# Patient Record
Sex: MALE | URBAN - METROPOLITAN AREA
[De-identification: names, ages, dates, MRNs, and addresses within clinical notes are randomized per-mention and may not be internally consistent; named-entity substitution may affect disease eponyms.]

---

## 2024-02-08 ENCOUNTER — APPOINTMENT (OUTPATIENT)
Dept: URBAN - METROPOLITAN AREA CLINIC 306 | Age: 64
Setting detail: DERMATOLOGY
End: 2024-02-08

## 2024-02-08 DIAGNOSIS — Z41.9 ENCOUNTER FOR PROCEDURE FOR PURPOSES OTHER THAN REMEDYING HEALTH STATE, UNSPECIFIED: ICD-10-CM

## 2024-02-08 PROCEDURE — OTHER DYSPORT: OTHER

## 2024-02-08 PROCEDURE — OTHER COSMETIC CONSULTATION: DYSPORT: OTHER

## 2024-02-08 NOTE — PROCEDURE: DYSPORT
Levator Labii Superioris Units: 0
Additional Area 2 Location: bunny lines
Show Levator Superior Units: Yes
Show Right And Left Brow Units: No
Consent: Written consent obtained. Risks include but not limited to lid/brow ptosis, bruising, swelling, diplopia, temporary effect, incomplete chemical denervation.
Post-Care Instructions: Patient instructed to not lie down for 4 hours and limit physical activity for 24 hours.
Additional Area 3 Location: gummy smile
Detail Level: Detailed
Glabellar Complex Units: 50
Additional Area 1 Location: lip flip
Dilution (U/0.1 Cc): 20
Show Inventory Tab: Show